# Patient Record
Sex: FEMALE | Race: WHITE | NOT HISPANIC OR LATINO | URBAN - METROPOLITAN AREA
[De-identification: names, ages, dates, MRNs, and addresses within clinical notes are randomized per-mention and may not be internally consistent; named-entity substitution may affect disease eponyms.]

---

## 2021-01-14 DIAGNOSIS — Z23 NEED FOR VACCINATION: ICD-10-CM

## 2021-12-21 ENCOUNTER — OFFICE VISIT (OUTPATIENT)
Dept: URGENT CARE | Facility: CLINIC | Age: 86
End: 2021-12-21
Payer: MEDICARE

## 2021-12-21 ENCOUNTER — APPOINTMENT (OUTPATIENT)
Dept: RADIOLOGY | Facility: IMAGING CENTER | Age: 86
End: 2021-12-21
Attending: PHYSICIAN ASSISTANT
Payer: MEDICARE

## 2021-12-21 VITALS
BODY MASS INDEX: 20.99 KG/M2 | HEIGHT: 65 IN | SYSTOLIC BLOOD PRESSURE: 126 MMHG | OXYGEN SATURATION: 98 % | TEMPERATURE: 97.4 F | RESPIRATION RATE: 14 BRPM | WEIGHT: 126 LBS | DIASTOLIC BLOOD PRESSURE: 64 MMHG | HEART RATE: 95 BPM

## 2021-12-21 DIAGNOSIS — S97.82XA CRUSHING INJURY OF LEFT FOOT, INITIAL ENCOUNTER: ICD-10-CM

## 2021-12-21 DIAGNOSIS — S90.32XA CONTUSION OF LEFT FOOT, INITIAL ENCOUNTER: ICD-10-CM

## 2021-12-21 PROBLEM — R26.2 DIFFICULTY IN WALKING, NOT ELSEWHERE CLASSIFIED: Status: ACTIVE | Noted: 2021-09-24

## 2021-12-21 PROBLEM — H91.90 HEARING LOSS: Status: ACTIVE | Noted: 2021-10-21

## 2021-12-21 PROBLEM — Z86.73 PERSONAL HISTORY OF TRANSIENT ISCHEMIC ATTACK (TIA), AND CEREBRAL INFARCTION WITHOUT RESIDUAL DEFICITS: Status: ACTIVE | Noted: 2021-09-24

## 2021-12-21 PROBLEM — M62.82 RHABDOMYOLYSIS: Status: ACTIVE | Noted: 2021-09-24

## 2021-12-21 PROBLEM — R41.841 COGNITIVE COMMUNICATION DEFICIT: Status: ACTIVE | Noted: 2021-09-24

## 2021-12-21 PROBLEM — S72.142D DISPLACED INTERTROCHANTERIC FRACTURE OF LEFT FEMUR, SUBSEQUENT ENCOUNTER FOR CLOSED FRACTURE WITH ROUTINE HEALING: Status: ACTIVE | Noted: 2021-09-24

## 2021-12-21 PROBLEM — J45.909 UNSPECIFIED ASTHMA, UNCOMPLICATED: Status: ACTIVE | Noted: 2021-09-24

## 2021-12-21 PROBLEM — Z79.01 LONG TERM (CURRENT) USE OF ANTICOAGULANTS: Status: ACTIVE | Noted: 2021-09-24

## 2021-12-21 PROBLEM — K59.00 CONSTIPATION, UNSPECIFIED: Status: ACTIVE | Noted: 2021-09-24

## 2021-12-21 PROBLEM — M62.81 MUSCLE WEAKNESS (GENERALIZED): Status: ACTIVE | Noted: 2021-09-24

## 2021-12-21 PROBLEM — T81.49XD INFECTION FOLLOWING A PROCEDURE, OTHER SURGICAL SITE, SUBSEQUENT ENCOUNTER: Status: ACTIVE | Noted: 2021-09-27
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
## 2021-12-21 PROBLEM — R26.81 UNSTEADINESS ON FEET: Status: ACTIVE | Noted: 2021-09-24

## 2021-12-21 PROCEDURE — 73630 X-RAY EXAM OF FOOT: CPT | Mod: TC,LT | Performed by: PHYSICIAN ASSISTANT

## 2021-12-21 PROCEDURE — 99203 OFFICE O/P NEW LOW 30 MIN: CPT | Performed by: PHYSICIAN ASSISTANT

## 2021-12-21 RX ORDER — SULFAMETHOXAZOLE AND TRIMETHOPRIM 800; 160 MG/1; MG/1
TABLET ORAL
COMMUNITY
Start: 2021-11-01 | End: 2022-01-03

## 2021-12-21 ASSESSMENT — ENCOUNTER SYMPTOMS
ARTHRALGIAS: 1
FEVER: 0
SHORTNESS OF BREATH: 0
WEAKNESS: 0
VOMITING: 0
NAUSEA: 0
TINGLING: 0
NUMBNESS: 0
COUGH: 0
CHILLS: 0
SENSORY CHANGE: 0
FOCAL WEAKNESS: 0

## 2021-12-22 NOTE — PROGRESS NOTES
Subjective     Randi Sawyer is a 89 y.o. female who presents with Foot Pain ((L) x 1 month and today she dropped a shelf on the L foot.  Caretaker state that the foot is gray in color. )            Foot Problem  This is a new problem. Episode onset: left foot- shelf fell off of wall onto left foot about 1-2 hours ago. The problem occurs constantly (pain is constant). The problem has been unchanged. Associated symptoms include arthralgias (left foot pain and swelling  ). Pertinent negatives include no chest pain, chills, coughing, fever, nausea, numbness, rash, vomiting or weakness. Associated symptoms comments: Bruising on top of left foot . Exacerbated by: palpation. Able to bear weight but hurts  She has tried acetaminophen and ice for the symptoms. The treatment provided mild relief.   Caregiver with patient and acts as historian. She reports left foot swelling since hip surgery several months ago. No calf pain, swelling, redness or warmth.    Past Medical History:   Diagnosis Date   • ASTHMA        Past Surgical History:   Procedure Laterality Date   • HYSTERECTOMY RADICAL         No family history on file.    Allergies   Allergen Reactions   • Asa [Aspirin]    • Pcn [Penicillins]      No po       Medications, Allergies, and current problem list reviewed today in Epic      Review of Systems   Constitutional: Negative for chills, fever and malaise/fatigue.   Respiratory: Negative for cough and shortness of breath.    Cardiovascular: Negative for chest pain and leg swelling.   Gastrointestinal: Negative for nausea and vomiting.   Musculoskeletal: Positive for arthralgias (left foot pain and swelling  ) and joint pain (left foot swelling, pain and bruising ).   Skin: Negative for rash.   Neurological: Negative for tingling, sensory change, focal weakness, weakness and numbness.         All other systems reviewed and are negative.         Objective     /64   Pulse 95   Temp 36.3 °C (97.4 °F) (Temporal)   " Resp 14   Ht 1.651 m (5' 5\")   Wt 57.2 kg (126 lb)   SpO2 98%   BMI 20.97 kg/m²      Physical Exam  Constitutional:       General: She is not in acute distress.  HENT:      Head: Normocephalic and atraumatic.   Eyes:      Conjunctiva/sclera: Conjunctivae normal.   Cardiovascular:      Rate and Rhythm: Normal rate.   Pulmonary:      Effort: Pulmonary effort is normal. No respiratory distress.      Breath sounds: No wheezing.   Musculoskeletal:        Feet:       Comments: Left lower leg without calf edema or TTP. No erythema or warmth.   Skin:     General: Skin is warm and dry.      Findings: Bruising present. No erythema.   Neurological:      General: No focal deficit present.      Mental Status: She is alert and oriented to person, place, and time.   Psychiatric:         Mood and Affect: Mood normal.         Behavior: Behavior normal.         Thought Content: Thought content normal.         Judgment: Judgment normal.                12/21/2021 5:28 PM     HISTORY/REASON FOR EXAM:  Pain/Deformity Following Trauma  For pain after injury     TECHNIQUE/EXAM DESCRIPTION AND NUMBER OF VIEWS:  3 views of the LEFT foot.     COMPARISON:  None.     FINDINGS:  There is no fracture or dislocation.  The visualized osseous structures are in anatomic alignment.  Mild degenerative changes in the forefoot.  Bone mineralization is decreased..  Small plantar calcaneal spur.  Soft tissue swelling involving the dorsum of the foot.     IMPRESSION:     Soft tissue swelling without acute osseous abnormality. If pain persists, repeat radiographs in 7-10 days is recommended.                Assessment & Plan        1. Crushing injury of left foot, initial encounter  DX-FOOT-COMPLETE 3+ LEFT   2. Contusion of left foot, initial encounter         - DX-FOOT-COMPLETE 3+ LEFT; Future     X-ray reviewed. Agree with RAD above.  Patient advised to return in 7-10 days for repeat X-rays if pain persists.  ACE wrap to left foot  RICE  OTC Tylenol. " WBAT    Differential diagnoses, Supportive care, and indications for immediate follow-up discussed with patient and caregiver.   Pathogenesis of diagnosis discussed including typical length and natural progression.   Instructed to return to clinic or nearest emergency department for any change in condition, further concerns, or worsening of symptoms.    The patient demonstrated a good understanding and agreed with the treatment plan.    Tashia Beckett P.A.-C.

## 2021-12-23 ENCOUNTER — SUPERVISING PHYSICIAN REVIEW (OUTPATIENT)
Dept: URGENT CARE | Facility: CLINIC | Age: 86
End: 2021-12-23

## 2021-12-24 NOTE — PROGRESS NOTES
Addendum 12/23/2021 .Encounter notes reviewed, I was not present to physically examine patient myself. No obvious and/or significant quality issues found solely from doing a chart review. GENET Oneal M.D.

## 2022-01-03 ENCOUNTER — OFFICE VISIT (OUTPATIENT)
Dept: URGENT CARE | Facility: CLINIC | Age: 87
End: 2022-01-03
Payer: MEDICARE

## 2022-01-03 VITALS
RESPIRATION RATE: 14 BRPM | TEMPERATURE: 97.4 F | DIASTOLIC BLOOD PRESSURE: 68 MMHG | HEART RATE: 90 BPM | WEIGHT: 121.4 LBS | OXYGEN SATURATION: 97 % | HEIGHT: 65 IN | BODY MASS INDEX: 20.23 KG/M2 | SYSTOLIC BLOOD PRESSURE: 118 MMHG

## 2022-01-03 DIAGNOSIS — S90.32XD CONTUSION OF LEFT FOOT, SUBSEQUENT ENCOUNTER: ICD-10-CM

## 2022-01-03 DIAGNOSIS — L08.9 LEFT FOOT INFECTION: ICD-10-CM

## 2022-01-03 DIAGNOSIS — S97.82XD CRUSHING INJURY OF LEFT FOOT, SUBSEQUENT ENCOUNTER: ICD-10-CM

## 2022-01-03 PROCEDURE — 99214 OFFICE O/P EST MOD 30 MIN: CPT | Performed by: NURSE PRACTITIONER

## 2022-01-03 RX ORDER — CEPHALEXIN 250 MG/1
250 CAPSULE ORAL 4 TIMES DAILY
Qty: 28 CAPSULE | Refills: 0 | Status: SHIPPED | OUTPATIENT
Start: 2022-01-03 | End: 2022-01-10

## 2022-01-03 NOTE — PROGRESS NOTES
"Subjective:   Randi Sawyer is a 89 y.o. female who presents for Foot Pain (x 2 weeks after a shelf fell on the foot.  Pt. was seen for injury on 12-21-21. )       HPI  Pt presents for evaluation of a new problem, is accompanied by caregiver and  who states that patient dropped a shelf on her foot on 12/21, was seen in the urgent care, however continues to have redness, swelling, and slight tenderness.  Patient states that she may have suffered an abrasion at time of the shelf falling on her foot, which may have become infected due to patient accidentally hitting it on different items around the house.  Patient has not tried anything for symptoms.    Review of Systems   Constitutional: Negative.    HENT: Negative.    Eyes: Negative.    Respiratory: Negative.    Cardiovascular: Negative.    Gastrointestinal: Negative.    Genitourinary: Negative.    Musculoskeletal: Positive for joint pain (left foot).   Skin: Negative.    Neurological: Negative.    Psychiatric/Behavioral: Negative.    All other systems reviewed and are negative.      MEDS:   Current Outpatient Medications:   •  alprazolam (XANAX) 0.25 MG TABS, Take 0.25 mg by mouth at bedtime as needed. For anxiety, Disp: , Rfl:   •  pseudoephedrine (SUDAFED) 30 MG TABS, Take 30 mg by mouth 2 Times a Day. **OTC** , Disp: , Rfl:   •  Chlorpheniramine Maleate (CHLOR-TRIMETON ALLERGY) 12 MG TBCR, Take 1 Tab by mouth 2 times a day as needed. For allergies **OTC** , Disp: , Rfl:   ALLERGIES:   Allergies   Allergen Reactions   • Asa [Aspirin]    • Pcn [Penicillins]      No po       Patient's PMH, SocHx, SurgHx, FamHx, Drug allergies and medications were reviewed.     Objective:   /68   Pulse 90   Temp 36.3 °C (97.4 °F) (Temporal)   Resp 14   Ht 1.651 m (5' 5\")   Wt 55.1 kg (121 lb 6.4 oz)   SpO2 97%   BMI 20.20 kg/m²     Physical Exam  Vitals and nursing note reviewed.   Constitutional:       General: She is awake.      Appearance: Normal " appearance. She is well-developed.   HENT:      Head: Normocephalic and atraumatic.      Right Ear: External ear normal.      Left Ear: External ear normal.      Nose: Nose normal.      Mouth/Throat:      Mouth: Mucous membranes are moist.      Pharynx: Oropharynx is clear.   Eyes:      Extraocular Movements: Extraocular movements intact.      Conjunctiva/sclera: Conjunctivae normal.   Cardiovascular:      Rate and Rhythm: Normal rate and regular rhythm.   Pulmonary:      Effort: Pulmonary effort is normal.      Breath sounds: Normal breath sounds.   Musculoskeletal:         General: Normal range of motion.      Cervical back: Normal range of motion and neck supple.        Feet:    Feet:      Comments: Erythema, mild edema, mild TTP, open wound in center of area diagrammed, no bleeding or drianage  Skin:     General: Skin is warm and dry.   Neurological:      Mental Status: She is alert and oriented to person, place, and time.   Psychiatric:         Mood and Affect: Mood normal.         Behavior: Behavior normal.         Thought Content: Thought content normal.         Assessment/Plan:   Assessment    1. Left foot infection  - cephALEXin (KEFLEX) 250 MG Cap; Take 1 Capsule by mouth 4 times a day for 7 days.  Dispense: 28 Capsule; Refill: 0    2. Contusion of left foot, subsequent encounter    3. Crushing injury of left foot, subsequent encounter    Vital signs stable at today's acute urgent care visit.  Begin medications as listed. Discussed management options (risks, benefits, and alternatives to treatment). Close monitoring recommended and discussed at length.    Advised the patient to follow-up with the primary care provider for recheck, reevaluation, and/or consideration of further management.  Return to urgent care with any worsening symptoms or if there is no improvement in their current condition. Red flags discussed and indications to immediately call 911 or present to the ED.  All questions were encouraged  and answered to the patient's satisfaction and understanding, and they agree to the plan of care.     I personally reviewed prior external notes and test results pertinent to today's visit.  I have independently reviewed and interpreted all diagnostics ordered during this urgent care acute visit. Time spent evaluating this patient was a minimum of 30 minutes and includes preparing for visit, counseling/education, exam, evaluation, obtaining history, and ordering lab/test/procedures.      Please note that this dictation was created using voice recognition software. I have made a reasonable attempt to correct obvious errors, but I expect that there are errors of grammar and possibly content that I did not discover before finalizing the note.

## 2022-01-09 ASSESSMENT — ENCOUNTER SYMPTOMS
CARDIOVASCULAR NEGATIVE: 1
EYES NEGATIVE: 1
RESPIRATORY NEGATIVE: 1
CONSTITUTIONAL NEGATIVE: 1
PSYCHIATRIC NEGATIVE: 1
GASTROINTESTINAL NEGATIVE: 1
NEUROLOGICAL NEGATIVE: 1

## 2025-05-09 ENCOUNTER — HOSPITAL ENCOUNTER (EMERGENCY)
Facility: MEDICAL CENTER | Age: OVER 89
End: 2025-05-09
Attending: EMERGENCY MEDICINE
Payer: MEDICARE

## 2025-05-09 ENCOUNTER — APPOINTMENT (OUTPATIENT)
Dept: RADIOLOGY | Facility: MEDICAL CENTER | Age: OVER 89
End: 2025-05-09
Attending: EMERGENCY MEDICINE
Payer: MEDICARE

## 2025-05-09 ENCOUNTER — OFFICE VISIT (OUTPATIENT)
Dept: URGENT CARE | Facility: CLINIC | Age: OVER 89
End: 2025-05-09
Payer: MEDICARE

## 2025-05-09 VITALS
TEMPERATURE: 98.2 F | OXYGEN SATURATION: 99 % | RESPIRATION RATE: 20 BRPM | HEART RATE: 36 BPM | HEIGHT: 65 IN | DIASTOLIC BLOOD PRESSURE: 82 MMHG | WEIGHT: 110.01 LBS | SYSTOLIC BLOOD PRESSURE: 186 MMHG | BODY MASS INDEX: 18.33 KG/M2

## 2025-05-09 VITALS
WEIGHT: 110.1 LBS | DIASTOLIC BLOOD PRESSURE: 62 MMHG | SYSTOLIC BLOOD PRESSURE: 118 MMHG | HEIGHT: 65 IN | BODY MASS INDEX: 18.34 KG/M2 | HEART RATE: 41 BPM | OXYGEN SATURATION: 95 % | TEMPERATURE: 97 F

## 2025-05-09 DIAGNOSIS — R00.1 BRADYCARDIA: ICD-10-CM

## 2025-05-09 DIAGNOSIS — R60.0 BILATERAL LOWER EXTREMITY EDEMA: ICD-10-CM

## 2025-05-09 DIAGNOSIS — L03.116 CELLULITIS OF LEFT LOWER EXTREMITY: ICD-10-CM

## 2025-05-09 DIAGNOSIS — R60.0 PERIPHERAL EDEMA: ICD-10-CM

## 2025-05-09 DIAGNOSIS — R07.9 INTERMITTENT CHEST PAIN: ICD-10-CM

## 2025-05-09 LAB
ALBUMIN SERPL BCP-MCNC: 4 G/DL (ref 3.2–4.9)
ALBUMIN/GLOB SERPL: 1.8 G/DL
ALP SERPL-CCNC: 97 U/L (ref 30–99)
ALT SERPL-CCNC: 17 U/L (ref 2–50)
ANION GAP SERPL CALC-SCNC: 10 MMOL/L (ref 7–16)
ANISOCYTOSIS BLD QL SMEAR: ABNORMAL
AST SERPL-CCNC: 17 U/L (ref 12–45)
BASOPHILS # BLD AUTO: 0.4 % (ref 0–1.8)
BASOPHILS # BLD: 0.03 K/UL (ref 0–0.12)
BILIRUB SERPL-MCNC: 0.5 MG/DL (ref 0.1–1.5)
BUN SERPL-MCNC: 21 MG/DL (ref 8–22)
CALCIUM ALBUM COR SERPL-MCNC: 9 MG/DL (ref 8.5–10.5)
CALCIUM SERPL-MCNC: 9 MG/DL (ref 8.5–10.5)
CHLORIDE SERPL-SCNC: 110 MMOL/L (ref 96–112)
CO2 SERPL-SCNC: 22 MMOL/L (ref 20–33)
COMMENT 1642: NORMAL
CREAT SERPL-MCNC: 0.88 MG/DL (ref 0.5–1.4)
EKG IMPRESSION: NORMAL
EOSINOPHIL # BLD AUTO: 0.02 K/UL (ref 0–0.51)
EOSINOPHIL NFR BLD: 0.3 % (ref 0–6.9)
ERYTHROCYTE [DISTWIDTH] IN BLOOD BY AUTOMATED COUNT: 45.1 FL (ref 35.9–50)
GFR SERPLBLD CREATININE-BSD FMLA CKD-EPI: 60 ML/MIN/1.73 M 2
GLOBULIN SER CALC-MCNC: 2.2 G/DL (ref 1.9–3.5)
GLUCOSE SERPL-MCNC: 97 MG/DL (ref 65–99)
HCT VFR BLD AUTO: 37.8 % (ref 37–47)
HGB BLD-MCNC: 11.3 G/DL (ref 12–16)
HYPOCHROMIA BLD QL SMEAR: ABNORMAL
IMM GRANULOCYTES # BLD AUTO: 0.02 K/UL (ref 0–0.11)
IMM GRANULOCYTES NFR BLD AUTO: 0.3 % (ref 0–0.9)
LYMPHOCYTES # BLD AUTO: 1.69 K/UL (ref 1–4.8)
LYMPHOCYTES NFR BLD: 24.5 % (ref 22–41)
MAGNESIUM SERPL-MCNC: 2.1 MG/DL (ref 1.5–2.5)
MCH RBC QN AUTO: 22.2 PG (ref 27–33)
MCHC RBC AUTO-ENTMCNC: 29.9 G/DL (ref 32.2–35.5)
MCV RBC AUTO: 74.1 FL (ref 81.4–97.8)
MICROCYTES BLD QL SMEAR: ABNORMAL
MONOCYTES # BLD AUTO: 0.44 K/UL (ref 0–0.85)
MONOCYTES NFR BLD AUTO: 6.4 % (ref 0–13.4)
MORPHOLOGY BLD-IMP: NORMAL
NEUTROPHILS # BLD AUTO: 4.7 K/UL (ref 1.82–7.42)
NEUTROPHILS NFR BLD: 68.1 % (ref 44–72)
NRBC # BLD AUTO: 0 K/UL
NRBC BLD-RTO: 0 /100 WBC (ref 0–0.2)
NT-PROBNP SERPL IA-MCNC: 2178 PG/ML (ref 0–125)
OVALOCYTES BLD QL SMEAR: NORMAL
PLATELET # BLD AUTO: 237 K/UL (ref 164–446)
PLATELET BLD QL SMEAR: NORMAL
PMV BLD AUTO: 10.7 FL (ref 9–12.9)
POIKILOCYTOSIS BLD QL SMEAR: NORMAL
POTASSIUM SERPL-SCNC: 4 MMOL/L (ref 3.6–5.5)
PROT SERPL-MCNC: 6.2 G/DL (ref 6–8.2)
RBC # BLD AUTO: 5.1 M/UL (ref 4.2–5.4)
RBC BLD AUTO: PRESENT
SCHISTOCYTES BLD QL SMEAR: NORMAL
SODIUM SERPL-SCNC: 142 MMOL/L (ref 135–145)
T4 FREE SERPL-MCNC: 1.07 NG/DL (ref 0.93–1.7)
TROPONIN T SERPL-MCNC: 23 NG/L (ref 6–19)
TSH SERPL-ACNC: 3.1 UIU/ML (ref 0.38–5.33)
WBC # BLD AUTO: 6.9 K/UL (ref 4.8–10.8)

## 2025-05-09 PROCEDURE — 93971 EXTREMITY STUDY: CPT | Mod: LT

## 2025-05-09 PROCEDURE — 83880 ASSAY OF NATRIURETIC PEPTIDE: CPT

## 2025-05-09 PROCEDURE — 3078F DIAST BP <80 MM HG: CPT | Performed by: PHYSICIAN ASSISTANT

## 2025-05-09 PROCEDURE — 99285 EMERGENCY DEPT VISIT HI MDM: CPT

## 2025-05-09 PROCEDURE — 83735 ASSAY OF MAGNESIUM: CPT

## 2025-05-09 PROCEDURE — 85025 COMPLETE CBC W/AUTO DIFF WBC: CPT

## 2025-05-09 PROCEDURE — 84439 ASSAY OF FREE THYROXINE: CPT

## 2025-05-09 PROCEDURE — 84443 ASSAY THYROID STIM HORMONE: CPT

## 2025-05-09 PROCEDURE — 3074F SYST BP LT 130 MM HG: CPT | Performed by: PHYSICIAN ASSISTANT

## 2025-05-09 PROCEDURE — 93005 ELECTROCARDIOGRAM TRACING: CPT | Mod: TC

## 2025-05-09 PROCEDURE — 71045 X-RAY EXAM CHEST 1 VIEW: CPT

## 2025-05-09 PROCEDURE — 36415 COLL VENOUS BLD VENIPUNCTURE: CPT

## 2025-05-09 PROCEDURE — 84484 ASSAY OF TROPONIN QUANT: CPT

## 2025-05-09 PROCEDURE — 99205 OFFICE O/P NEW HI 60 MIN: CPT | Performed by: PHYSICIAN ASSISTANT

## 2025-05-09 PROCEDURE — 80053 COMPREHEN METABOLIC PANEL: CPT

## 2025-05-09 PROCEDURE — 93005 ELECTROCARDIOGRAM TRACING: CPT | Mod: TC | Performed by: EMERGENCY MEDICINE

## 2025-05-09 PROCEDURE — 94760 N-INVAS EAR/PLS OXIMETRY 1: CPT

## 2025-05-09 RX ORDER — CEPHALEXIN 500 MG/1
500 CAPSULE ORAL 4 TIMES DAILY
Qty: 28 CAPSULE | Refills: 0 | Status: ACTIVE | OUTPATIENT
Start: 2025-05-09 | End: 2025-05-16

## 2025-05-09 RX ORDER — CEPHALEXIN 500 MG/1
500 CAPSULE ORAL ONCE
Status: DISCONTINUED | OUTPATIENT
Start: 2025-05-09 | End: 2025-05-09 | Stop reason: HOSPADM

## 2025-05-09 ASSESSMENT — LIFESTYLE VARIABLES
EVER FELT BAD OR GUILTY ABOUT YOUR DRINKING: NO
DO YOU DRINK ALCOHOL: NO
TOTAL SCORE: 0
CONSUMPTION TOTAL: INCOMPLETE
TOTAL SCORE: 0
EVER HAD A DRINK FIRST THING IN THE MORNING TO STEADY YOUR NERVES TO GET RID OF A HANGOVER: NO
TOTAL SCORE: 0
HAVE PEOPLE ANNOYED YOU BY CRITICIZING YOUR DRINKING: NO
HAVE YOU EVER FELT YOU SHOULD CUT DOWN ON YOUR DRINKING: NO

## 2025-05-09 ASSESSMENT — PAIN DESCRIPTION - PAIN TYPE
TYPE: ACUTE PAIN
TYPE: ACUTE PAIN

## 2025-05-09 NOTE — ED PROVIDER NOTES
"ER Provider Note    Scribed for Elie Mosqueda M.D. by Navin Townsend. 5/9/2025   11:59 AM    Primary Care Provider: Rupert Torres M.D.    CHIEF COMPLAINT  Chief Complaint   Patient presents with    Sent by MD     Sent by  for redness and swelling in bilateral feet, pitting edema to bilateral lower legs. X10 days    In addition, sent for bradycardia. No reported cardiac hx.      EXTERNAL RECORDS REVIEWED  Outpatient Notes Patient was seen at Urgent Care and sent here for foot swelling. Patient was seen in 2022 for left foot infection.    HPI/ROS  LIMITATION TO HISTORY   Select: : None  OUTSIDE HISTORIAN(S):  Family at bedside to provide additional history    Randi Sawyer is a 89 y.o. female who presents to the ED for evaluation of bilateral erythema and swelling on feet onset 10 days ago. Patient was seen at urgent care prior to arrival who recommended she present to the ED for symptom evaluation. Patient has associated symptoms of leg pain, legs feeling \"hot\", and chest pain and denies any shortness of breath or fever. Patient states chest pain is resolved when she lays down. Patients family reports the patient is able to ambulate without pain this morning. Patients family reports she had occasional dizziness a few weeks ago which has since then resolved. He also notes patient had a recent fall 2-3 weeks ago but was able to ambulate normally. Patient has no history of heart problems and has never been seen by a cardiologist. Patient is currently not on any BP or heart medications. Patient has a history of a hip fracture 5 years ago which has caused her to have chronic pain. Patient was last seen by a doctor 2-3 years ago. Patients family notes she takes tylenol for arthritis.        PAST MEDICAL HISTORY  Past Medical History:   Diagnosis Date    ASTHMA        SURGICAL HISTORY  Past Surgical History:   Procedure Laterality Date    HYSTERECTOMY RADICAL         FAMILY HISTORY  History reviewed. No " "pertinent family history.    SOCIAL HISTORY   reports that she has never smoked. She has never used smokeless tobacco. She reports that she does not drink alcohol and does not use drugs.    CURRENT MEDICATIONS  Discharge Medication List as of 5/9/2025  3:08 PM        CONTINUE these medications which have NOT CHANGED    Details   alprazolam (XANAX) 0.25 MG TABS Take 0.25 mg by mouth at bedtime as needed. For anxiety, Historical Med      pseudoephedrine (SUDAFED) 30 MG TABS Take 30 mg by mouth 2 Times a Day. **OTC** , Historical Med      Chlorpheniramine Maleate (CHLOR-TRIMETON ALLERGY) 12 MG TBCR Take 1 Tab by mouth 2 times a day as needed. For allergies **OTC** , Historical Med             ALLERGIES  Allergies   Allergen Reactions    Asa [Aspirin]     Bactrim Ds      rash    Pcn [Penicillins]      No po        PHYSICAL EXAM  BP (!) 184/93   Pulse (!) 42   Temp 36.2 °C (97.1 °F) (Temporal)   Resp 16   Ht 1.651 m (5' 5\")   Wt 49.9 kg (110 lb 0.2 oz)   SpO2 98%   BMI 18.31 kg/m²    Constitutional: Well developed, Well nourished, mild distress,    HENT: Normocephalic, Atraumatic   Eyes: PERRLA, EOMI, Conjunctiva normal, No discharge.   Neck: No tenderness, Supple, No stridor.   Cardiovascular: Bradycardia, Normal rhythm.   Thorax & Lungs: Clear to auscultation bilaterally, No respiratory distress.   Abdomen: Soft, No tenderness, No masses.   Skin: Warm, Dry, No rash.    Musculoskeletal: Left leg with 2+ pitting edema, Slight erythema on medial aspect of the ankle   Neurologic: Awake, alert. Moves all extremities spontaneously.  Psychiatric: Affect normal, Judgment normal, Mood normal.       DIAGNOSTIC STUDIES    Labs:   Results for orders placed or performed during the hospital encounter of 05/09/25   CBC w/ Differential    Collection Time: 05/09/25 12:09 PM   Result Value Ref Range    WBC 6.9 4.8 - 10.8 K/uL    RBC 5.10 4.20 - 5.40 M/uL    Hemoglobin 11.3 (L) 12.0 - 16.0 g/dL    Hematocrit 37.8 37.0 - 47.0 %    " MCV 74.1 (L) 81.4 - 97.8 fL    MCH 22.2 (L) 27.0 - 33.0 pg    MCHC 29.9 (L) 32.2 - 35.5 g/dL    RDW 45.1 35.9 - 50.0 fL    Platelet Count 237 164 - 446 K/uL    MPV 10.7 9.0 - 12.9 fL    Neutrophils-Polys 68.10 44.00 - 72.00 %    Lymphocytes 24.50 22.00 - 41.00 %    Monocytes 6.40 0.00 - 13.40 %    Eosinophils 0.30 0.00 - 6.90 %    Basophils 0.40 0.00 - 1.80 %    Immature Granulocytes 0.30 0.00 - 0.90 %    Nucleated RBC 0.00 0.00 - 0.20 /100 WBC    Neutrophils (Absolute) 4.70 1.82 - 7.42 K/uL    Lymphs (Absolute) 1.69 1.00 - 4.80 K/uL    Monos (Absolute) 0.44 0.00 - 0.85 K/uL    Eos (Absolute) 0.02 0.00 - 0.51 K/uL    Baso (Absolute) 0.03 0.00 - 0.12 K/uL    Immature Granulocytes (abs) 0.02 0.00 - 0.11 K/uL    NRBC (Absolute) 0.00 K/uL    Hypochromia 1+     Anisocytosis 1+     Microcytosis 1+    Complete Metabolic Panel (CMP)    Collection Time: 05/09/25 12:09 PM   Result Value Ref Range    Sodium 142 135 - 145 mmol/L    Potassium 4.0 3.6 - 5.5 mmol/L    Chloride 110 96 - 112 mmol/L    Co2 22 20 - 33 mmol/L    Anion Gap 10.0 7.0 - 16.0    Glucose 97 65 - 99 mg/dL    Bun 21 8 - 22 mg/dL    Creatinine 0.88 0.50 - 1.40 mg/dL    Calcium 9.0 8.5 - 10.5 mg/dL    Correct Calcium 9.0 8.5 - 10.5 mg/dL    AST(SGOT) 17 12 - 45 U/L    ALT(SGPT) 17 2 - 50 U/L    Alkaline Phosphatase 97 30 - 99 U/L    Total Bilirubin 0.5 0.1 - 1.5 mg/dL    Albumin 4.0 3.2 - 4.9 g/dL    Total Protein 6.2 6.0 - 8.2 g/dL    Globulin 2.2 1.9 - 3.5 g/dL    A-G Ratio 1.8 g/dL   proBrain Natriuretic Peptide, NT    Collection Time: 05/09/25 12:09 PM   Result Value Ref Range    NT-proBNP 2178 (H) 0 - 125 pg/mL   Troponin - STAT Once    Collection Time: 05/09/25 12:09 PM   Result Value Ref Range    Troponin T 23 (H) 6 - 19 ng/L   MAGNESIUM    Collection Time: 05/09/25 12:09 PM   Result Value Ref Range    Magnesium 2.1 1.5 - 2.5 mg/dL   FREE THYROXINE    Collection Time: 05/09/25 12:09 PM   Result Value Ref Range    Free T-4 1.07 0.93 - 1.70 ng/dL   TSH     Collection Time: 25 12:09 PM   Result Value Ref Range    TSH 3.100 0.380 - 5.330 uIU/mL   ESTIMATED GFR    Collection Time: 25 12:09 PM   Result Value Ref Range    GFR (CKD-EPI) 60 >60 mL/min/1.73 m 2   PLATELET ESTIMATE    Collection Time: 25 12:09 PM   Result Value Ref Range    Plt Estimation Normal    MORPHOLOGY    Collection Time: 25 12:09 PM   Result Value Ref Range    RBC Morphology Present     Poikilocytosis 2+     Ovalocytes 1+     Schistocytes 1+    PERIPHERAL SMEAR REVIEW    Collection Time: 25 12:09 PM   Result Value Ref Range    Peripheral Smear Review see below    DIFFERENTIAL COMMENT    Collection Time: 25 12:09 PM   Result Value Ref Range    Comments-Diff see below    EKG    Collection Time: 25 12:11 PM   Result Value Ref Range    Report       [facility] Emergency Dept.    Test Date:  2025  Pt Name:    PAUL SALAZAR                 Department: ER  MRN:        [id number]                      Room:  Gender:     Female                       Technician: [id number]  :        [id number]                   Requested By:ER TRIAGE PROTOCOL  Order #:    [id number]                    Reading MD: JENNIFER CUNNINGHAM MD    Measurements  Intervals                                Axis  Rate:       39                           P:          82  MS:         106                          QRS:        -36  QRSD:       107                          T:          19  QT:         500  QTc:        403    Interpretive Statements  Sinus bradycardia  Short MS interval  Left axis deviation  Minimal ST depression, lateral leads  No previous ECG available for comparison  Electronically Signed On 2025 12:11:42 PDT by JENNIFER CUNNINGHAM MD         Radiology:   This attending emergency physician has independently interpreted the diagnostic imaging associated with this visit and is awaiting the final reading from the radiologist.   Preliminary interpretation is a follows: No  pneumonia  Radiologist interpretation:   DX-CHEST-PORTABLE (1 VIEW)   Final Result      No acute cardiopulmonary abnormality.      US-EXTREMITY VENOUS LOWER UNILAT LEFT   Final Result         No blood clot    COURSE & MEDICAL DECISION MAKING     INITIAL ASSESSMENT, COURSE AND PLAN  Differential diagnoses include but not limited to: Bradycardia, Cellulitis, DVT, CHF, thyroid.     Care Narrative: Patient coming in with left leg swelling with slight erythema, the patient is also bradycardic down into the upper 30s low 40s.  Workup here shows the patient is not having any symptomatic bradycardia, thyroid is unremarkable, laboratory tests are unremarkable, the patient's BNP is slightly elevated but the patient's chest x-ray does not show any pulmonary edema.  At this time I do not believe admission to the hospital is appropriate given the patient is not having symptomatic bradycardia.  I feel that given the patient is 89 years old I do not believe she would be a candidate for a pacemaker at this time.  I have offered antibiotics for the family, they are reluctant to take this.  I will refer the patient to cardiology for outpatient follow-up.    9:15 AM - Ordered for an EKG at this time.    11:59 AM - Patient was evaluated at bedside. Ordered for TSH, free thyroxine, troponin, BNP, CMP, CBC w/Diff, IV saline shock, US-Extremity venous lower, magnesium to evaluate. Patient verbalizes understanding and support with my plan of care.      I reevaluated the patient at bedside.  I discussed the patient's diagnostic study results which show no blood clot. I discussed plan for discharge and follow up as outlined below. The patient is stable for discharge at this time and will return for any new or worsening symptoms. Patient verbalizes understanding and support with my plan for discharge.        DISPOSITION AND DISCUSSIONS    I have discussed management of the patient with the following physicians and LAZARO's:  None    Discussion  of management with other [facility] or appropriate source(s): None     Escalation of care considered, and ultimately not performed: acute inpatient care management, however at this time, the patient is most appropriate for outpatient management.    Barriers to care at this time, including but not limited to:  None .     Decision tools and prescription drugs considered including, but not limited to: Antibiotics Keflex .    The patient will return for new or worsening symptoms and is stable at the time of discharge.    The patient is referred to a primary physician for blood pressure management, diabetic screening, and for all other preventative health concerns.      DISPOSITION:  Patient will be discharged home in stable condition.    FOLLOW UP:  [facility], Emergency Dept  [address] [facility] [address] [phone]    If symptoms worsen      OUTPATIENT MEDICATIONS:  Discharge Medication List as of 5/9/2025  3:08 PM        START taking these medications    Details   cephALEXin (KEFLEX) 500 MG Cap Take 1 Capsule by mouth 4 times a day for 7 days., Disp-28 Capsule, R-0, Normal              FINAL DIAGNOSIS  1. Cellulitis of left lower extremity    2. Peripheral edema    3. Bradycardia         Navin KING (Adonay), am scribing for, and in the presence of, Elie Mosqueda M.D..    Electronically signed by: Navin Townsend (Adonya), 5/9/2025    Elie KING M.D. personally performed the services described in this documentation, as scribed by Navin Townsend in my presence, and it is both accurate and complete.      The note accurately reflects work and decisions made by me.  Elie Mosqueda M.D.  5/9/2025  5:36 PM

## 2025-05-09 NOTE — ED NOTES
Discharge teaching and paperwork provided regarding cellulitis and all questions/concerns answered. VSS,  assessment stable and PIV removed. Given information regarding home care and reasons to follow up with ED or primary MD. Patient provided keflex Rx. Patient discharged to the care of son and ambulated out of the ED.

## 2025-05-09 NOTE — PROGRESS NOTES
"Subjective:   Randi Sawyer is a 89 y.o. female who presents for Foot Swelling (X10 DAYS bilateral foot swollen and discolored )      HPI  The patient presents to the Urgent Care brought in by her son with complaints of bilateral lower extremity edema and redness worse on the left for about 10 days.  She otherwise feels okay right now.  No injury.  Does report of intermittent chest pains for a while.  She states she has been worried about her heart.  She denies any chest pain right now. Intermittent dizziness but denies any dizziness now.   She denies any fever or chills.  Denies any shortness of breath.  No cough.  Denies history of heart disease.  No history of hypertension.  She has not had lower extremity edema before.  Last time she saw PCP was a few years ago.  No history of CHF.  No new medications.  She did not take Xanax this morning she states.        Past Medical History:   Diagnosis Date    ASTHMA      Allergies   Allergen Reactions    Asa [Aspirin]     Bactrim Ds      rash    Pcn [Penicillins]      No po        Objective:     /62   Pulse (!) 44   Temp 36.1 °C (97 °F) (Temporal)   Ht 1.651 m (5' 5\")   Wt 49.9 kg (110 lb 1.6 oz)   SpO2 98%   BMI 18.32 kg/m²     Physical Exam  Vitals reviewed.   Constitutional:       General: She is not in acute distress.     Appearance: Normal appearance. She is not ill-appearing or toxic-appearing.   Eyes:      Conjunctiva/sclera: Conjunctivae normal.   Cardiovascular:      Rate and Rhythm: Regular rhythm. Bradycardia present.      Heart sounds: Normal heart sounds.   Pulmonary:      Effort: Pulmonary effort is normal.      Breath sounds: Normal breath sounds.   Musculoskeletal:      Cervical back: Neck supple. No rigidity.      Right lower leg: No tenderness. 2+ Edema present.      Left lower leg: No tenderness. 3+ Edema present.      Comments: Mild erythema to bilateral lower extremities.   Skin:     General: Skin is warm and dry.   Neurological:      " General: No focal deficit present.      Mental Status: She is alert and oriented to person, place, and time.   Psychiatric:         Mood and Affect: Mood normal.         Behavior: Behavior normal.       Diagnosis and associated orders:     1. Bilateral lower extremity edema    2. Bradycardia    3. Intermittent chest pain       Comments/MDM:     This is a 89-year-old female who presents to the urgent care with her son with complaints of 10 days of bilateral lower extremity edema left worse than right.  No history of edema.  The lower legs seem to be red as well. She is retaining fluid.  Low suspicion for cellulitis.  She also notes recently she has been concerned about her heart.  She has been having intermittent chest pains for a while but denies any chest pain or shortness of breath right now.  I do have concern for heart failure.  She is bradycardic at 41 bpm.  Fortunately, BP and SpO2 is normal.   Discussed with patient and her son I am concerned about her heart and she requires further evaluation in the emergency room.  Therefore, is highly recommended she present immediately to the ER for higher level evaluation and care.  Patient and her son verbalized understanding and agreed to plan.  Her son is going to drive her directly across the street to [facility] ER.  I called transfer center and report given awaiting patient arrival.           Please note that this dictation was created using voice recognition software. I have made a reasonable attempt to correct obvious errors, but I expect that there are errors of grammar and possibly content that I did not discover before finalizing the note.    This note was electronically signed by Adolph Steward PA-C

## 2025-05-09 NOTE — ED TRIAGE NOTES
Chief Complaint   Patient presents with    Sent by MD     Sent by  for redness and swelling in bilateral feet, pitting edema to bilateral lower legs. X10 days    In addition, sent for bradycardia. No reported cardiac hx.      Pt ambulatory to triage for above complaint.      Pt is alert/oriented and follows commands. Pt speaking in full sentences and responds appropriately to questions. No acute distress noted in triage and respirations are even and unlabored.     Pt placed in lobby and educated on triage process. Pt encouraged to alert staff for any changes in condition.

## 2025-05-23 ENCOUNTER — TELEPHONE (OUTPATIENT)
Dept: HEALTH INFORMATION MANAGEMENT | Facility: OTHER | Age: OVER 89
End: 2025-05-23
Payer: MEDICARE